# Patient Record
Sex: FEMALE | Race: WHITE | NOT HISPANIC OR LATINO | Employment: FULL TIME | URBAN - METROPOLITAN AREA
[De-identification: names, ages, dates, MRNs, and addresses within clinical notes are randomized per-mention and may not be internally consistent; named-entity substitution may affect disease eponyms.]

---

## 2024-03-01 ENCOUNTER — OFFICE VISIT (OUTPATIENT)
Dept: OBSTETRICS AND GYNECOLOGY | Facility: CLINIC | Age: 74
End: 2024-03-01
Payer: MEDICARE

## 2024-03-01 VITALS — DIASTOLIC BLOOD PRESSURE: 76 MMHG | WEIGHT: 108.44 LBS | SYSTOLIC BLOOD PRESSURE: 148 MMHG

## 2024-03-01 DIAGNOSIS — Z00.00 ROUTINE MEDICAL EXAM: Primary | ICD-10-CM

## 2024-03-01 DIAGNOSIS — Z01.419 ENCOUNTER FOR GYNECOLOGICAL EXAMINATION (GENERAL) (ROUTINE) WITHOUT ABNORMAL FINDINGS: ICD-10-CM

## 2024-03-01 DIAGNOSIS — Z12.31 VISIT FOR SCREENING MAMMOGRAM: ICD-10-CM

## 2024-03-01 PROCEDURE — 99999 PR PBB SHADOW E&M-NEW PATIENT-LVL III: CPT | Mod: PBBFAC,,, | Performed by: OBSTETRICS & GYNECOLOGY

## 2024-03-01 PROCEDURE — G0101 CA SCREEN;PELVIC/BREAST EXAM: HCPCS | Mod: S$GLB,,, | Performed by: OBSTETRICS & GYNECOLOGY

## 2024-03-01 NOTE — PROGRESS NOTES
Subjective:       Patient ID: Daysi Vail is a 73 y.o. female.    Chief Complaint:  Well Woman        History of Present Illness  Daysi Vail is a 73 y.o. female  who presents for new patient annual. I saw her as a patient at [facility] years ago. No gyn concerns. Had her encapsulated implants removed. Discussed her recent stressful occurrences in her life since I saw her last, changes in family and friends health, jobs, businesses, etc. Does not have a primary care doctor, will enter a referral. All questions answered.     No LMP recorded.   Date of Last Pap: No result found    Review of Systems  Review of Systems   Constitutional:  Negative for chills and fever.   Gastrointestinal:  Negative for abdominal pain, constipation, diarrhea, nausea and vomiting.   Genitourinary:  Negative for dysuria, flank pain, frequency, hematuria, pelvic pain, urgency and vaginal discharge.   Musculoskeletal:  Negative for back pain.   Skin:  Negative for rash.   Neurological:  Negative for headaches.        Objective:   Physical Exam:   Constitutional: She is oriented to person, place, and time. Vital signs are normal. She appears well-developed and well-nourished. No distress.        Pulmonary/Chest: She exhibits no mass. Right breast exhibits no mass, no nipple discharge, no skin change, no tenderness, no bleeding and no swelling. Left breast exhibits no mass, no nipple discharge, no skin change, no tenderness, no bleeding and no swelling. Breasts are symmetrical.        Abdominal: Soft. Bowel sounds are normal. She exhibits no distension and no mass. There is no abdominal tenderness. There is no rebound.     Genitourinary:    Vagina and uterus normal.   There is no rash, tenderness, lesion or injury on the right labia. There is no rash, tenderness, lesion or injury on the left labia. Cervix is normal. Right adnexum displays no mass, no tenderness and no fullness. Left adnexum displays no mass, no tenderness and no fullness. No  erythema, vaginal discharge, tenderness, rectocele, cystocele or prolapse of vaginal walls in the vagina. Cervix exhibits no motion tenderness, no discharge and no friability. Uterus is not deviated, not enlarged, not fixed, not tender and not hosting fibroids.           Musculoskeletal: Normal range of motion and moves all extremeties.      Lymphadenopathy:        Right: No supraclavicular adenopathy present.        Left: No supraclavicular adenopathy present.    Neurological: She is alert and oriented to person, place, and time.    Skin: Skin is warm and dry.    Psychiatric: She has a normal mood and affect. Her behavior is normal. Judgment normal.        Assessment/ Plan:     1. Routine medical exam  Ambulatory referral/consult to Internal Medicine      2. Visit for screening mammogram  Mammo Digital Screening Bilat w/ [facility]      3. Encounter for gynecological examination (general) (routine) without abnormal findings            No follow-ups on file.    As of April 1, 2021, the Cures Act has been passed nationally. This new law requires that all doctors progress notes, lab results, pathology reports and radiology reports be released IMMEDIATELY to the patient in the patient portal. That means that the results are released to you at the EXACT same time they are released to me. Therefore, with all of the patients that I have I am not able to reply to each patient exactly when the results come in. So there will be a delay from when you see the results to when I see them and have time to come up with a response to send you. Also I only see these results when I am on the computer at work. So if the results come in over the weekend or after 5 pm of a work day, I will not see them until the next business day. As you can tell, this is a challenge as a physician to give every patient the quick response they hope for and deserve. So please be patient! Thanks for understanding, Dr. Robison    Answers submitted by the patient  for this visit:  Gynecologic Exam Questionnaire  (Submitted on 2024)  Chief Complaint: Gynecologic exam  genital itching: No  genital lesions: No  genital odor: No  genital rash: No  missed menses: No  vaginal bleeding: No  Pain severity: no pain  Pregnant now?: No  anorexia: No  discolored urine: No  painful intercourse: No  Vaginal bleeding: no bleeding  Passing clots?: No  Passing tissue?: No  Aggravated by: nothing  treatments tried: nothing  Partner with STD symptoms: no  STD: No  abdominal surgery: No   section: Yes  Ectopic pregnancy: No  Endometriosis: No  herpes simplex: No  gynecological surgery: No  menorrhagia: No  metrorrhagia: No  miscarriage: No  ovarian cysts: No  perineal abscess: No  PID: No  terminated pregnancy: No  vaginosis: No

## 2024-06-13 ENCOUNTER — OFFICE VISIT (OUTPATIENT)
Dept: PRIMARY CARE CLINIC | Facility: CLINIC | Age: 74
End: 2024-06-13
Payer: MEDICARE

## 2024-06-13 VITALS
OXYGEN SATURATION: 98 % | HEIGHT: 63 IN | RESPIRATION RATE: 16 BRPM | BODY MASS INDEX: 18.95 KG/M2 | DIASTOLIC BLOOD PRESSURE: 60 MMHG | WEIGHT: 106.94 LBS | SYSTOLIC BLOOD PRESSURE: 130 MMHG | HEART RATE: 81 BPM

## 2024-06-13 DIAGNOSIS — R63.4 WEIGHT LOSS: ICD-10-CM

## 2024-06-13 DIAGNOSIS — Z86.16 HISTORY OF COVID-19: ICD-10-CM

## 2024-06-13 DIAGNOSIS — Z78.0 POSTMENOPAUSE: ICD-10-CM

## 2024-06-13 DIAGNOSIS — Z12.31 ENCOUNTER FOR SCREENING MAMMOGRAM FOR MALIGNANT NEOPLASM OF BREAST: ICD-10-CM

## 2024-06-13 DIAGNOSIS — Z87.891 FORMER SMOKER: ICD-10-CM

## 2024-06-13 DIAGNOSIS — Z02.82 ADOPTED: ICD-10-CM

## 2024-06-13 DIAGNOSIS — Z13.220 ENCOUNTER FOR LIPID SCREENING FOR CARDIOVASCULAR DISEASE: ICD-10-CM

## 2024-06-13 DIAGNOSIS — Z00.00 ENCOUNTER FOR MEDICAL EXAMINATION TO ESTABLISH CARE: Primary | ICD-10-CM

## 2024-06-13 DIAGNOSIS — Z98.86 HISTORY OF BILATERAL REMOVAL OF BREAST IMPLANTS: ICD-10-CM

## 2024-06-13 DIAGNOSIS — L30.9 ECZEMA, UNSPECIFIED TYPE: ICD-10-CM

## 2024-06-13 DIAGNOSIS — Z12.11 SCREEN FOR COLON CANCER: ICD-10-CM

## 2024-06-13 DIAGNOSIS — Z13.6 ENCOUNTER FOR LIPID SCREENING FOR CARDIOVASCULAR DISEASE: ICD-10-CM

## 2024-06-13 DIAGNOSIS — Z96.641 HISTORY OF RIGHT HIP REPLACEMENT: ICD-10-CM

## 2024-06-13 PROBLEM — Z78.9 ADOPTED: Status: ACTIVE | Noted: 2024-06-13

## 2024-06-13 PROCEDURE — 1160F RVW MEDS BY RX/DR IN RCRD: CPT | Mod: CPTII,S$GLB,, | Performed by: NURSE PRACTITIONER

## 2024-06-13 PROCEDURE — 3008F BODY MASS INDEX DOCD: CPT | Mod: CPTII,S$GLB,, | Performed by: NURSE PRACTITIONER

## 2024-06-13 PROCEDURE — 3075F SYST BP GE 130 - 139MM HG: CPT | Mod: CPTII,S$GLB,, | Performed by: NURSE PRACTITIONER

## 2024-06-13 PROCEDURE — 99387 INIT PM E/M NEW PAT 65+ YRS: CPT | Mod: S$GLB,,, | Performed by: NURSE PRACTITIONER

## 2024-06-13 PROCEDURE — 1159F MED LIST DOCD IN RCRD: CPT | Mod: CPTII,S$GLB,, | Performed by: NURSE PRACTITIONER

## 2024-06-13 PROCEDURE — 3078F DIAST BP <80 MM HG: CPT | Mod: CPTII,S$GLB,, | Performed by: NURSE PRACTITIONER

## 2024-06-13 PROCEDURE — 99999 PR PBB SHADOW E&M-EST. PATIENT-LVL V: CPT | Mod: PBBFAC,,, | Performed by: NURSE PRACTITIONER

## 2024-06-13 RX ORDER — BIOTIN 1000 MCG
TABLET,CHEWABLE ORAL
COMMUNITY

## 2024-06-13 NOTE — PROGRESS NOTES
[facility] Note    Chief Complaint      Chief Complaint   Patient presents with    Establish Care     History of Present Illness      Daysi Vail is a 73 y.o. female patient with chronic conditions of none who presents today for [facility] care.      Labs ordered  Eye exams  Gyn- Dr. Robison  Mammogram-3/2024  Colonoscopy ordered cologard  DEXA scan-ordered      Vaccines:  COVID-decline  Tdap-gave prescription  Pneumonia-gave prescription  Shingrix  RSV      Health Maintenance   Topic Date Due    Hepatitis C Screening  Never done    Lipid Panel  Never done    TETANUS VACCINE  Never done    Mammogram  Never done    DEXA Scan  Never done    Colorectal Cancer Screening  Never done    Shingles Vaccine (1 of 2) Never done       No past medical history on file.    Past Surgical History:   Procedure Laterality Date    AUGMENTATION OF BREAST      Implants removed    BREAST SURGERY  2017    Impmant removal     SECTION  1977    COLPOSCOPY      EYE SURGERY  2010    Lasik    JOINT REPLACEMENT  2019    Hip replacement    TUBAL LIGATION         family history is not on file.    Social History     Tobacco Use    Smoking status: Former     Types: Cigarettes    Smokeless tobacco: Never   Substance Use Topics    Alcohol use: Yes     Alcohol/week: 4.0 standard drinks of alcohol     Types: 4 Glasses of wine per week    Drug use: Never       Review of Systems   Constitutional:  Positive for weight loss. Negative for chills and fever.   HENT:  Negative for congestion, sinus pain and sore throat.    Eyes:  Negative for blurred vision.   Respiratory:  Negative for cough, shortness of breath and wheezing.    Cardiovascular:  Negative for chest pain, palpitations and leg swelling.   Gastrointestinal:  Negative for abdominal pain, constipation, diarrhea, nausea and vomiting.   Genitourinary:  Negative for dysuria.   Musculoskeletal:  Negative for myalgias.   Skin:  Negative for rash.   Neurological:  Negative for  "dizziness, weakness and headaches.   Psychiatric/Behavioral:  Negative for depression. The patient is not nervous/anxious.         Outpatient Encounter Medications as of 6/13/2024   Medication Sig Dispense Refill    biotin 1,000 mcg Chew Take by mouth.      multivit-minerals/folic acid (WOMEN'S MULTIVITAMIN GUMMIES ORAL) Take by mouth.      multivitamin with minerals tablet Take 1 tablet by mouth once daily.      TURMERIC ORAL Take by mouth.       No facility-administered encounter medications on file as of 6/13/2024.        Review of patient's allergies indicates:  No Known Allergies    Physical Exam      Vital Signs  Pulse: 81  Resp: 16  SpO2: 98 %  BP: 130/60  BP Location: Right arm  Patient Position: Sitting  Height and Weight  Height: 5' 3" (160 cm)  Weight: 48.5 kg (106 lb 14.8 oz)  BSA (Calculated - sq m): 1.47 sq meters  BMI (Calculated): 18.9  Weight in (lb) to have BMI = 25: 140.8    Physical Exam  Vitals and nursing note reviewed.   Constitutional:       General: She is not in acute distress.     Appearance: Normal appearance. She is well-developed. She is not ill-appearing.   HENT:      Head: Normocephalic and atraumatic.      Right Ear: External ear normal.      Left Ear: External ear normal.   Eyes:      Conjunctiva/sclera: Conjunctivae normal.      Pupils: Pupils are equal, round, and reactive to light.   Neck:      Thyroid: No thyromegaly.      Vascular: No JVD.      Trachea: No tracheal deviation.   Cardiovascular:      Rate and Rhythm: Normal rate and regular rhythm.      Heart sounds: Normal heart sounds. No murmur heard.  Pulmonary:      Effort: Pulmonary effort is normal.      Breath sounds: Normal breath sounds.   Chest:      Chest wall: No tenderness.   Abdominal:      General: Bowel sounds are normal.      Palpations: Abdomen is soft.      Tenderness: There is no abdominal tenderness. There is no guarding.   Musculoskeletal:         General: Normal range of motion.      Cervical back: Normal " "range of motion and neck supple.   Lymphadenopathy:      Cervical: No cervical adenopathy.   Skin:     General: Skin is warm and dry.   Neurological:      General: No focal deficit present.      Mental Status: She is alert and oriented to person, place, and time.   Psychiatric:         Mood and Affect: Mood normal.         Behavior: Behavior normal.         Thought Content: Thought content normal.         Judgment: Judgment normal.          Laboratory:  CBC:  No results found for: "WBC", "RBC", "HGB", "HCT", "PLT", "MCV", "MCH", "MCHC"  CMP:  No results found for: "GLU", "CALCIUM", "ALBUMIN", "PROT", "NA", "K", "CO2", "CL", "BUN", "ALKPHOS", "ALT", "AST", "BILITOT"  URINALYSIS:  No results found for: "COLORU", "CLARITYU", "SPECGRAV", "PHUR", "PROTEINUA", "GLUCOSEU", "BILIRUBINCON", "BLOODU", "WBCU", "RBCU", "BACTERIA", "MUCUS", "NITRITE", "LEUKOCYTESUR", "UROBILINOGEN", "HYALINECASTS"   LIPIDS:  No results found for: "TSH", "HDL", "CHOL", "TRIG", "LDLCALC", "CHOLHDL", "NONHDLCHOL", "TOTALCHOLEST"  TSH:  No results found for: "TSH"  A1C:  No results found for: "HGBA1C"      Assessment/Plan     Daysi Vail is a 73 y.o.female with:    Encounter for medical examination to establish care  -     Ambulatory referral/consult to Internal Medicine  -     CBC Auto Differential; Future; Expected date: 06/13/2024  -     Comprehensive Metabolic Panel; Future; Expected date: 06/13/2024  -     Lipid Panel; Future; Expected date: 06/13/2024  -     TSH; Future; Expected date: 06/13/2024  -     Ambulatory referral/consult to Dermatology; Future; Expected date: 06/13/2024  -     Cologuard Screening (Multitarget Stool DNA); Future; Expected date: 06/13/2024  -     DXA Bone Density Axial Skeleton 1 or more sites; Future; Expected date: 06/13/2024  -     Mammo Digital Screening Bilat w/ [facility]; Future; Expected date: 06/13/2024    Eczema, unspecified type  -     Ambulatory referral/consult to Internal Medicine  -     CBC Auto " Differential; Future; Expected date: 06/13/2024  -     Comprehensive Metabolic Panel; Future; Expected date: 06/13/2024  -     Lipid Panel; Future; Expected date: 06/13/2024  -     TSH; Future; Expected date: 06/13/2024  -     Ambulatory referral/consult to Dermatology; Future; Expected date: 06/13/2024    Postmenopause  -     Ambulatory referral/consult to Internal Medicine  -     CBC Auto Differential; Future; Expected date: 06/13/2024  -     Comprehensive Metabolic Panel; Future; Expected date: 06/13/2024  -     Lipid Panel; Future; Expected date: 06/13/2024  -     TSH; Future; Expected date: 06/13/2024  -     DXA Bone Density Axial Skeleton 1 or more sites; Future; Expected date: 06/13/2024  -     Mammo Digital Screening Bilat w/ [facility]; Future; Expected date: 06/13/2024    Weight loss  -     Ambulatory referral/consult to Internal Medicine  -     CBC Auto Differential; Future; Expected date: 06/13/2024  -     Comprehensive Metabolic Panel; Future; Expected date: 06/13/2024  -     Lipid Panel; Future; Expected date: 06/13/2024  -     TSH; Future; Expected date: 06/13/2024  -     DXA Bone Density Axial Skeleton 1 or more sites; Future; Expected date: 06/13/2024    Former smoker  -     Ambulatory referral/consult to Internal Medicine  -     CBC Auto Differential; Future; Expected date: 06/13/2024  -     Comprehensive Metabolic Panel; Future; Expected date: 06/13/2024  -     Lipid Panel; Future; Expected date: 06/13/2024  -     TSH; Future; Expected date: 06/13/2024  -     Ambulatory referral/consult to Dermatology; Future; Expected date: 06/13/2024  -     Cologuard Screening (Multitarget Stool DNA); Future; Expected date: 06/13/2024  -     DXA Bone Density Axial Skeleton 1 or more sites; Future; Expected date: 06/13/2024  -     Mammo Digital Screening Bilat w/ [facility]; Future; Expected date: 06/13/2024    Adopted  -     Ambulatory referral/consult to Internal Medicine  -     CBC Auto Differential; Future; Expected  date: 06/13/2024  -     Comprehensive Metabolic Panel; Future; Expected date: 06/13/2024  -     Lipid Panel; Future; Expected date: 06/13/2024  -     TSH; Future; Expected date: 06/13/2024    Encounter for lipid screening for cardiovascular disease  -     Ambulatory referral/consult to Internal Medicine  -     CBC Auto Differential; Future; Expected date: 06/13/2024  -     Comprehensive Metabolic Panel; Future; Expected date: 06/13/2024  -     Lipid Panel; Future; Expected date: 06/13/2024  -     TSH; Future; Expected date: 06/13/2024    Screen for colon cancer  -     Ambulatory referral/consult to Internal Medicine  -     CBC Auto Differential; Future; Expected date: 06/13/2024  -     Comprehensive Metabolic Panel; Future; Expected date: 06/13/2024  -     Lipid Panel; Future; Expected date: 06/13/2024  -     TSH; Future; Expected date: 06/13/2024  -     Cologuard Screening (Multitarget Stool DNA); Future; Expected date: 06/13/2024    Encounter for screening mammogram for malignant neoplasm of breast  -     Ambulatory referral/consult to Internal Medicine  -     CBC Auto Differential; Future; Expected date: 06/13/2024  -     Comprehensive Metabolic Panel; Future; Expected date: 06/13/2024  -     Lipid Panel; Future; Expected date: 06/13/2024  -     TSH; Future; Expected date: 06/13/2024  -     Mammo Digital Screening Bilat w/ [facility]; Future; Expected date: 06/13/2024    History of bilateral removal of breast implants  -     Ambulatory referral/consult to Internal Medicine  -     CBC Auto Differential; Future; Expected date: 06/13/2024  -     Comprehensive Metabolic Panel; Future; Expected date: 06/13/2024  -     Lipid Panel; Future; Expected date: 06/13/2024  -     TSH; Future; Expected date: 06/13/2024  -     Mammo Digital Screening Bilat w/ [facility]; Future; Expected date: 06/13/2024    History of right hip replacement  -     Ambulatory referral/consult to Internal Medicine  -     CBC Auto Differential; Future; Expected  date: 06/13/2024  -     Comprehensive Metabolic Panel; Future; Expected date: 06/13/2024  -     Lipid Panel; Future; Expected date: 06/13/2024  -     TSH; Future; Expected date: 06/13/2024  -     DXA Bone Density Axial Skeleton 1 or more sites; Future; Expected date: 06/13/2024    History of COVID-19  -     Ambulatory referral/consult to Internal Medicine  -     CBC Auto Differential; Future; Expected date: 06/13/2024  -     Comprehensive Metabolic Panel; Future; Expected date: 06/13/2024  -     Lipid Panel; Future; Expected date: 06/13/2024  -     TSH; Future; Expected date: 06/13/2024          Health Maintenance Due   Topic Date Due    Hepatitis C Screening  Never done    Lipid Panel  Never done    TETANUS VACCINE  Never done    Mammogram  Never done    DEXA Scan  Never done    Colorectal Cancer Screening  Never done    Shingles Vaccine (1 of 2) Never done    RSV Vaccine (Age 60+ and Pregnant patients) (1 - 1-dose 60+ series) Never done    Pneumococcal Vaccines (Age 65+) (1 of 1 - PCV) Never done    COVID-19 Vaccine (1 - 2023-24 season) Never done        I spent 39 minutes on the day of this encounter for preparing for, evaluating, treating, and managing this patient.      -Continue current medications and maintain follow up with specialists.  Return to clinic in 1 year or sooner for any concerns   No follow-ups on file.       KITTY RothC  [facility]

## 2024-06-17 ENCOUNTER — LAB VISIT (OUTPATIENT)
Dept: LAB | Facility: HOSPITAL | Age: 74
End: 2024-06-17
Attending: NURSE PRACTITIONER
Payer: MEDICARE

## 2024-06-17 DIAGNOSIS — Z12.11 SCREEN FOR COLON CANCER: ICD-10-CM

## 2024-06-17 DIAGNOSIS — Z00.00 ENCOUNTER FOR MEDICAL EXAMINATION TO ESTABLISH CARE: ICD-10-CM

## 2024-06-17 DIAGNOSIS — L30.9 ECZEMA, UNSPECIFIED TYPE: ICD-10-CM

## 2024-06-17 DIAGNOSIS — Z98.86 HISTORY OF BILATERAL REMOVAL OF BREAST IMPLANTS: ICD-10-CM

## 2024-06-17 DIAGNOSIS — Z12.31 ENCOUNTER FOR SCREENING MAMMOGRAM FOR MALIGNANT NEOPLASM OF BREAST: ICD-10-CM

## 2024-06-17 DIAGNOSIS — Z02.82 ADOPTED: ICD-10-CM

## 2024-06-17 DIAGNOSIS — Z78.0 POSTMENOPAUSE: ICD-10-CM

## 2024-06-17 DIAGNOSIS — Z96.641 HISTORY OF RIGHT HIP REPLACEMENT: ICD-10-CM

## 2024-06-17 DIAGNOSIS — R63.4 WEIGHT LOSS: ICD-10-CM

## 2024-06-17 DIAGNOSIS — Z86.16 HISTORY OF COVID-19: ICD-10-CM

## 2024-06-17 DIAGNOSIS — Z13.220 ENCOUNTER FOR LIPID SCREENING FOR CARDIOVASCULAR DISEASE: ICD-10-CM

## 2024-06-17 DIAGNOSIS — Z87.891 FORMER SMOKER: ICD-10-CM

## 2024-06-17 DIAGNOSIS — Z13.6 ENCOUNTER FOR LIPID SCREENING FOR CARDIOVASCULAR DISEASE: ICD-10-CM

## 2024-06-17 LAB
ALBUMIN SERPL BCP-MCNC: 3.9 G/DL (ref 3.5–5.2)
ALP SERPL-CCNC: 58 U/L (ref 55–135)
ALT SERPL W/O P-5'-P-CCNC: 21 U/L (ref 10–44)
ANION GAP SERPL CALC-SCNC: 10 MMOL/L (ref 8–16)
AST SERPL-CCNC: 23 U/L (ref 10–40)
BASOPHILS # BLD AUTO: 0.04 K/UL (ref 0–0.2)
BASOPHILS NFR BLD: 0.8 % (ref 0–1.9)
BILIRUB SERPL-MCNC: 0.5 MG/DL (ref 0.1–1)
BUN SERPL-MCNC: 17 MG/DL (ref 8–23)
CALCIUM SERPL-MCNC: 9.4 MG/DL (ref 8.7–10.5)
CHLORIDE SERPL-SCNC: 106 MMOL/L (ref 95–110)
CHOLEST SERPL-MCNC: 213 MG/DL (ref 120–199)
CHOLEST/HDLC SERPL: 2.7 {RATIO} (ref 2–5)
CO2 SERPL-SCNC: 25 MMOL/L (ref 23–29)
CREAT SERPL-MCNC: 0.7 MG/DL (ref 0.5–1.4)
DIFFERENTIAL METHOD BLD: ABNORMAL
EOSINOPHIL # BLD AUTO: 0.3 K/UL (ref 0–0.5)
EOSINOPHIL NFR BLD: 6.2 % (ref 0–8)
ERYTHROCYTE [DISTWIDTH] IN BLOOD BY AUTOMATED COUNT: 13.2 % (ref 11.5–14.5)
EST. GFR  (NO RACE VARIABLE): >60 ML/MIN/1.73 M^2
GLUCOSE SERPL-MCNC: 96 MG/DL (ref 70–110)
HCT VFR BLD AUTO: 36.4 % (ref 37–48.5)
HDLC SERPL-MCNC: 78 MG/DL (ref 40–75)
HDLC SERPL: 36.6 % (ref 20–50)
HGB BLD-MCNC: 12.4 G/DL (ref 12–16)
IMM GRANULOCYTES # BLD AUTO: 0.01 K/UL (ref 0–0.04)
IMM GRANULOCYTES NFR BLD AUTO: 0.2 % (ref 0–0.5)
LDLC SERPL CALC-MCNC: 125.8 MG/DL (ref 63–159)
LYMPHOCYTES # BLD AUTO: 1.4 K/UL (ref 1–4.8)
LYMPHOCYTES NFR BLD: 29.9 % (ref 18–48)
MCH RBC QN AUTO: 31 PG (ref 27–31)
MCHC RBC AUTO-ENTMCNC: 34.1 G/DL (ref 32–36)
MCV RBC AUTO: 91 FL (ref 82–98)
MONOCYTES # BLD AUTO: 0.4 K/UL (ref 0.3–1)
MONOCYTES NFR BLD: 9.1 % (ref 4–15)
NEUTROPHILS # BLD AUTO: 2.5 K/UL (ref 1.8–7.7)
NEUTROPHILS NFR BLD: 53.8 % (ref 38–73)
NONHDLC SERPL-MCNC: 135 MG/DL
NRBC BLD-RTO: 0 /100 WBC
PLATELET # BLD AUTO: 232 K/UL (ref 150–450)
PMV BLD AUTO: 10.8 FL (ref 9.2–12.9)
POTASSIUM SERPL-SCNC: 4.2 MMOL/L (ref 3.5–5.1)
PROT SERPL-MCNC: 6.6 G/DL (ref 6–8.4)
RBC # BLD AUTO: 4 M/UL (ref 4–5.4)
SODIUM SERPL-SCNC: 141 MMOL/L (ref 136–145)
TRIGL SERPL-MCNC: 46 MG/DL (ref 30–150)
TSH SERPL DL<=0.005 MIU/L-ACNC: 2.15 UIU/ML (ref 0.4–4)
WBC # BLD AUTO: 4.71 K/UL (ref 3.9–12.7)

## 2024-06-17 PROCEDURE — 80061 LIPID PANEL: CPT | Performed by: NURSE PRACTITIONER

## 2024-06-17 PROCEDURE — 80053 COMPREHEN METABOLIC PANEL: CPT | Performed by: NURSE PRACTITIONER

## 2024-06-17 PROCEDURE — 84443 ASSAY THYROID STIM HORMONE: CPT | Performed by: NURSE PRACTITIONER

## 2024-06-17 PROCEDURE — 36415 COLL VENOUS BLD VENIPUNCTURE: CPT | Performed by: NURSE PRACTITIONER

## 2024-06-17 PROCEDURE — 85025 COMPLETE CBC W/AUTO DIFF WBC: CPT | Performed by: NURSE PRACTITIONER

## 2024-06-24 ENCOUNTER — HOSPITAL ENCOUNTER (OUTPATIENT)
Dept: RADIOLOGY | Facility: HOSPITAL | Age: 74
Discharge: HOME | End: 2024-06-24
Attending: NURSE PRACTITIONER
Payer: MEDICARE

## 2024-06-24 VITALS — HEIGHT: 63 IN | BODY MASS INDEX: 18.78 KG/M2 | WEIGHT: 106 LBS

## 2024-06-24 DIAGNOSIS — R63.4 WEIGHT LOSS: ICD-10-CM

## 2024-06-24 DIAGNOSIS — Z78.0 POSTMENOPAUSE: ICD-10-CM

## 2024-06-24 DIAGNOSIS — Z00.00 ENCOUNTER FOR MEDICAL EXAMINATION TO ESTABLISH CARE: ICD-10-CM

## 2024-06-24 DIAGNOSIS — Z87.891 FORMER SMOKER: ICD-10-CM

## 2024-06-24 DIAGNOSIS — Z96.641 HISTORY OF RIGHT HIP REPLACEMENT: ICD-10-CM

## 2024-06-24 DIAGNOSIS — Z12.31 ENCOUNTER FOR SCREENING MAMMOGRAM FOR MALIGNANT NEOPLASM OF BREAST: ICD-10-CM

## 2024-06-24 DIAGNOSIS — Z98.86 HISTORY OF BILATERAL REMOVAL OF BREAST IMPLANTS: ICD-10-CM

## 2024-06-24 PROCEDURE — 77080 DXA BONE DENSITY AXIAL: CPT | Mod: TC

## 2024-06-24 PROCEDURE — 77080 DXA BONE DENSITY AXIAL: CPT | Mod: 26,,, | Performed by: INTERNAL MEDICINE

## 2024-06-24 PROCEDURE — 77067 SCR MAMMO BI INCL CAD: CPT | Mod: TC

## 2024-09-24 ENCOUNTER — OFFICE VISIT (OUTPATIENT)
Dept: DERMATOLOGY | Facility: CLINIC | Age: 74
End: 2024-09-24
Payer: MEDICARE

## 2024-09-24 DIAGNOSIS — L71.0 PERIORAL DERMATITIS: ICD-10-CM

## 2024-09-24 DIAGNOSIS — L91.8 SKIN TAG: ICD-10-CM

## 2024-09-24 DIAGNOSIS — L30.9 ECZEMA, UNSPECIFIED TYPE: Primary | ICD-10-CM

## 2024-09-24 DIAGNOSIS — L57.0 ACTINIC KERATOSIS: ICD-10-CM

## 2024-09-24 PROCEDURE — 17000 DESTRUCT PREMALG LESION: CPT | Mod: S$GLB,,, | Performed by: STUDENT IN AN ORGANIZED HEALTH CARE EDUCATION/TRAINING PROGRAM

## 2024-09-24 PROCEDURE — 1126F AMNT PAIN NOTED NONE PRSNT: CPT | Mod: CPTII,S$GLB,, | Performed by: STUDENT IN AN ORGANIZED HEALTH CARE EDUCATION/TRAINING PROGRAM

## 2024-09-24 PROCEDURE — 99999 PR PBB SHADOW E&M-EST. PATIENT-LVL III: CPT | Mod: PBBFAC,,, | Performed by: STUDENT IN AN ORGANIZED HEALTH CARE EDUCATION/TRAINING PROGRAM

## 2024-09-24 PROCEDURE — 3288F FALL RISK ASSESSMENT DOCD: CPT | Mod: CPTII,S$GLB,, | Performed by: STUDENT IN AN ORGANIZED HEALTH CARE EDUCATION/TRAINING PROGRAM

## 2024-09-24 PROCEDURE — 1101F PT FALLS ASSESS-DOCD LE1/YR: CPT | Mod: CPTII,S$GLB,, | Performed by: STUDENT IN AN ORGANIZED HEALTH CARE EDUCATION/TRAINING PROGRAM

## 2024-09-24 PROCEDURE — 1159F MED LIST DOCD IN RCRD: CPT | Mod: CPTII,S$GLB,, | Performed by: STUDENT IN AN ORGANIZED HEALTH CARE EDUCATION/TRAINING PROGRAM

## 2024-09-24 PROCEDURE — 1160F RVW MEDS BY RX/DR IN RCRD: CPT | Mod: CPTII,S$GLB,, | Performed by: STUDENT IN AN ORGANIZED HEALTH CARE EDUCATION/TRAINING PROGRAM

## 2024-09-24 PROCEDURE — 99203 OFFICE O/P NEW LOW 30 MIN: CPT | Mod: 25,S$GLB,, | Performed by: STUDENT IN AN ORGANIZED HEALTH CARE EDUCATION/TRAINING PROGRAM

## 2024-09-24 RX ORDER — METRONIDAZOLE 7.5 MG/G
CREAM TOPICAL
Qty: 45 G | Refills: 3 | Status: SHIPPED | OUTPATIENT
Start: 2024-09-24

## 2024-09-24 NOTE — PROGRESS NOTES
Subjective:      Patient ID:  Daysi Vail is a 73 y.o. female who presents for No chief complaint on file.    73 y.o. female presents for rash.    New patient    1. Rash  Location: face around mouth and around eyes- itch    Duration: 15 years on and off  Current treatment: hydrocortisone   Prior treatments tried/failed:    Skin care products (i.e. soap, detergent): soap- baby soap detergent- TIDE   History of allergies (seasonal, food): none   History of asthma: no  Family history of eczema: unsure- adopted         Review of Systems   Skin:  Positive for itching.       Objective:   Physical Exam   Skin:   Areas Examined (abnormalities noted in diagram):   Head / Face Inspection Performed  Neck Inspection Performed                Diagram Legend     Erythematous scaling macule/papule c/w actinic keratosis       Vascular papule c/w angioma      Pigmented verrucoid papule/plaque c/w seborrheic keratosis      Yellow umbilicated papule c/w sebaceous hyperplasia      Irregularly shaped tan macule c/w lentigo     1-2 mm smooth white papules consistent with Milia      Movable subcutaneous cyst with punctum c/w epidermal inclusion cyst      Subcutaneous movable cyst c/w pilar cyst      Firm pink to brown papule c/w dermatofibroma      Pedunculated fleshy papule(s) c/w skin tag(s)      Evenly pigmented macule c/w junctional nevus     Mildly variegated pigmented, slightly irregular-bordered macule c/w mildly atypical nevus      Flesh colored to evenly pigmented papule c/w intradermal nevus       Pink pearly papule/plaque c/w basal cell carcinoma      Erythematous hyperkeratotic cursted plaque c/w SCC      Surgical scar with no sign of skin cancer recurrence      Open and closed comedones      Inflammatory papules and pustules      Verrucoid papule consistent consistent with wart     Erythematous eczematous patches and plaques     Dystrophic onycholytic nail with subungual debris c/w onychomycosis     Umbilicated papule     Erythematous-base heme-crusted tan verrucoid plaque consistent with inflamed seborrheic keratosis     Erythematous Silvery Scaling Plaque c/w Psoriasis     See annotation      Assessment / Plan:        Eczema, unspecified type  Perioral dermatitis  Very mild today- flaking erythematous patch near L oral commisure pt reports recurrent over years  Not responding to cortisone cream OTC   Recommend gentle cleanser and facial moisturizer  Consider perioral dermatitis- start metrocream BID to AA  May need to re-evaluate if worsening    Actinic keratosis  Cryosurgery Procedure Note    Verbal consent from the patient is obtained including, but not limited to, risk of hypopigmentation/hyperpigmentation, scar, recurrence of lesion. The patient is aware of the precancerous quality and need for treatment of these lesions. Liquid nitrogen cryosurgery is applied to the 1 actinic keratoses, as detailed in the physical exam, to produce a freeze injury. The patient is aware that blisters may form and is instructed on wound care with gentle cleansing and use of vaseline ointment to keep moist until healed. The patient is supplied a handout on cryosurgery and is instructed to call if lesions do not completely resolve.    Skin tag  Neck  Discussed cosmetic removal is $300 up to 15 out of pocket  Pt will contact office when ready to schedule cosmetic removal    RTC prn if condition worsens or fails to improve

## 2025-03-24 DIAGNOSIS — Z00.00 ENCOUNTER FOR MEDICARE ANNUAL WELLNESS EXAM: ICD-10-CM

## 2025-05-13 ENCOUNTER — OFFICE VISIT (OUTPATIENT)
Dept: CARDIOLOGY | Facility: CLINIC | Age: 75
End: 2025-05-13
Payer: MEDICARE

## 2025-05-13 VITALS
BODY MASS INDEX: 18.95 KG/M2 | HEART RATE: 72 BPM | DIASTOLIC BLOOD PRESSURE: 73 MMHG | SYSTOLIC BLOOD PRESSURE: 127 MMHG | WEIGHT: 106.94 LBS | HEIGHT: 63 IN

## 2025-05-13 DIAGNOSIS — Z13.6 ENCOUNTER FOR SCREENING FOR CARDIOVASCULAR DISORDERS: ICD-10-CM

## 2025-05-13 DIAGNOSIS — Z00.00 ROUTINE CHECK-UP: Primary | ICD-10-CM

## 2025-05-13 DIAGNOSIS — Z91.89 FRAMINGHAM CARDIAC RISK 10-20% IN NEXT 10 YEARS: ICD-10-CM

## 2025-05-13 LAB
OHS QRS DURATION: 80 MS
OHS QTC CALCULATION: 418 MS

## 2025-05-13 PROCEDURE — 99999 PR PBB SHADOW E&M-EST. PATIENT-LVL III: CPT | Mod: PBBFAC,,, | Performed by: INTERNAL MEDICINE

## 2025-05-13 RX ORDER — ASCORBIC ACID 500 MG
500 TABLET ORAL DAILY
COMMUNITY

## 2025-05-13 RX ORDER — GLUCOSAM/CHONDRO/HERB 149/HYAL 750-100 MG
TABLET ORAL DAILY
COMMUNITY

## 2025-05-13 RX ORDER — CHOLECALCIFEROL (VITAMIN D3) 125 MCG
CAPSULE ORAL DAILY
COMMUNITY

## 2025-05-13 RX ORDER — ZINC SULFATE 50(220)MG
220 CAPSULE ORAL DAILY
COMMUNITY

## 2025-05-13 NOTE — PROGRESS NOTES
Subjective:   05/13/2025     Patient ID:  Daysi Vail is a 74 y.o. female who presents for evaulation of No chief complaint on file.       History of Present Illness    CHIEF COMPLAINT:  Patient presents for cardiac clearance.    HPI:  Patient is a former smoker who quit many years ago. She reports high activity level and maintains an active lifestyle, including ballet and stretching exercises. She has a history of hip surgery 5 years ago due to a cheerleading-related injury. She denies chest pain, tightness, or dyspnea during physical activities. She has noticed varicose veins recently. She denies history of heart problems, HTN, or chest pain. No prior coronary calcium score test.    CARDIAC HISTORY:  EKG 2025-05-13: stable No chest pain with exercise EKG 2025-05-13: stable    TEST RESULTS:  Patient underwent several labs on 06/24. These included a TSH test with phenol, a lipid profile showing total cholesterol of 213, HDL of 78, triglycerides of 46, and LDL of 126. Additionally, a complete metabolic profile and CBC were performed, both of which were unremarkable.    MEDICAL HISTORY:  Patient has a history of smoking but is no longer a smoker.    SURGICAL HISTORY:  Patient underwent a right hip replacement 5 years ago due to a cheerleading-related injury.    FAMILY HISTORY:  Mother: 93 years old, developed health problems after COVID boosters  Grandmother (maternal): lived to 99 years and 9 months  Father: lived to 93 years Family history is significant for the patient's half-sister with Multiple Sclerosis (MS). Mother of the patient's half-sister had pancreatic cancer.    SOCIAL HISTORY:  Smoking: Former smoker, quit many years ago Occupation: Former cheerleader and dancer  Marital status:       ROS:  General: -fever, -chills, -fatigue, -weight gain, -weight loss  Eyes: -vision changes, -redness, -discharge  ENT: -ear pain, -nasal congestion, -sore throat  Cardiovascular: -chest pain, -palpitations,  -lower extremity edema  Respiratory: -cough, -shortness of breath  Gastrointestinal: -abdominal pain, -nausea, -vomiting, -diarrhea, -constipation, -blood in stool  Genitourinary: -dysuria, -hematuria, -frequency  Musculoskeletal: -joint pain, -muscle pain  Skin: -rash, -lesion  Neurological: -headache, -dizziness, -numbness, -tingling  Psychiatric: -anxiety, -depression, -sleep difficulty        The 10-year ASCVD risk score (Ban BURDICK, et al., 2019) is: 14.3%    Values used to calculate the score:      Age: 74 years      Sex: Female      Is Non- : No      Diabetic: No      Tobacco smoker: No      Systolic Blood Pressure: 127 mmHg      Is BP treated: No      HDL Cholesterol: 78 mg/dL      Total Cholesterol: 213 mg/dL    Problem List[1]     Review of patient's allergies indicates:  No Known Allergies    Current Medications[2]     Objective:   Physical Exam    General: No acute distress. Well-developed. Well-nourished.  Eyes: EOMI. Sclerae anicteric.  HENT: Normocephalic. Atraumatic. Nares patent. Moist oral mucosa.  Cardiovascular: Regular rate. Regular rhythm. No murmurs. No rubs. No gallops. Normal S1, S2.  Respiratory: Normal respiratory effort. Clear to auscultation bilaterally. No rales. No rhonchi. No wheezing.  Musculoskeletal: No  obvious deformity.  Extremities: No lower extremity edema.  Neurological: Alert & oriented x3. No slurred speech. Normal gait.  Psychiatric: Normal mood. Normal affect. Good insight. Good judgment.  Skin: Warm. Dry. No rash.          Vitals:    05/13/25 1424   BP: 127/73   Pulse: 72     Wt Readings from Last 3 Encounters:   05/13/25 48.5 kg (106 lb 14.8 oz)   06/24/24 48.1 kg (106 lb)   06/13/24 48.5 kg (106 lb 14.8 oz)     Temp Readings from Last 3 Encounters:   No data found for Temp     BP Readings from Last 3 Encounters:   05/13/25 127/73   06/13/24 130/60   03/01/24 (!) 148/76     Pulse Readings from Last 3 Encounters:   05/13/25 72   06/13/24 81                Lab Results   Component Value Date    CHOL 213 (H) 06/17/2024     Lab Results   Component Value Date    HDL 78 (H) 06/17/2024     Lab Results   Component Value Date    LDLCALC 125.8 06/17/2024     Lab Results   Component Value Date    ALT 21 06/17/2024    AST 23 06/17/2024     Lab Results   Component Value Date    CREATININE 0.7 06/17/2024    BUN 17 06/17/2024     06/17/2024    K 4.2 06/17/2024    CO2 25 06/17/2024     Lab Results   Component Value Date    HGB 12.4 06/17/2024    HCT 36.4 (L) 06/17/2024       Assessment and Plan:   Assessment & Plan    Z00.00 Routine check-up  Z91.89 [place] cardiac risk 10-20% in next 10 years  Z13.6 Encounter for screening for cardiovascular disorders    IMPRESSION:  - Considered statin therapy and further cholesterol evaluation if calcium score elevated.    [place] CARDIAC RISK 10-20% IN NEXT 10 YEARS:  - Follow up after calcium score results to discuss findings and potential need for further evaluation or treatment.    ENCOUNTER FOR SCREENING FOR CARDIOVASCULAR DISORDERS:  - Clarified that calcium score test is different from full-body scan offers received in mail.  - Recommend and ordered CT coronary calcium score to assess cardiovascular risk.          No follow-ups on file.        Future Appointments   Date Time Provider Department Center   5/19/2025  2:00 PM [facility]  CT1 [facility]SCAN [place]       This note was generated with the assistance of ambient listening technology. Verbal consent was obtained by the patient and accompanying visitor(s) for the recording of patient appointment to facilitate this note. I attest to having reviewed and edited the generated note for accuracy, though some syntax or spelling errors may persist. Please contact the author of this note for any clarification.                      [1]   Patient Active Problem List  Diagnosis    History of bilateral removal of breast implants    History of right hip replacement    Former smoker     Adopted    History of COVID-19    [place] cardiac risk 10-20% in next 10 years   [2]   Current Outpatient Medications:     ascorbic acid, vitamin C, (VITAMIN C WITH REG HIPS) 500 MG tablet, Take 500 mg by mouth once daily., Disp: , Rfl:     biotin 1,000 mcg Chew, Take by mouth Daily., Disp: , Rfl:     biotin 5,000 mcg TbDL, Take by mouth Daily., Disp: , Rfl:     metronidazole 0.75% (METROCREAM) 0.75 % Crea, AAA face bid, Disp: 45 g, Rfl: 3    multivitamin with minerals tablet, Take 1 tablet by mouth once daily., Disp: , Rfl:     omega 3-dha-epa-fish oil (FISH OIL) 1,000 (120-180) mg Cap, Take by mouth Daily. Omega XL/Joint & Muscle, Disp: , Rfl:     TURMERIC ORAL, Take by mouth Daily., Disp: , Rfl:     zinc sulfate (ZINCATE) 50 mg zinc (220 mg) capsule, Take 220 mg by mouth once daily., Disp: , Rfl:     multivit-minerals/folic acid (WOMEN'S MULTIVITAMIN GUMMIES ORAL), Take by mouth., Disp: , Rfl:

## 2025-05-19 ENCOUNTER — HOSPITAL ENCOUNTER (OUTPATIENT)
Dept: RADIOLOGY | Facility: HOSPITAL | Age: 75
Discharge: HOME | End: 2025-05-19
Attending: INTERNAL MEDICINE
Payer: MEDICARE

## 2025-05-19 DIAGNOSIS — Z13.6 ENCOUNTER FOR SCREENING FOR CARDIOVASCULAR DISORDERS: ICD-10-CM

## 2025-05-19 PROCEDURE — 75571 CT HRT W/O DYE W/CA TEST: CPT | Mod: 26,,, | Performed by: RADIOLOGY

## 2025-05-19 PROCEDURE — 75571 CT HRT W/O DYE W/CA TEST: CPT | Mod: TC

## 2025-05-26 ENCOUNTER — RESULTS FOLLOW-UP (OUTPATIENT)
Dept: CARDIOLOGY | Facility: CLINIC | Age: 75
End: 2025-05-26